# Patient Record
Sex: FEMALE | Race: WHITE | NOT HISPANIC OR LATINO | Employment: FULL TIME | URBAN - METROPOLITAN AREA
[De-identification: names, ages, dates, MRNs, and addresses within clinical notes are randomized per-mention and may not be internally consistent; named-entity substitution may affect disease eponyms.]

---

## 2017-05-04 ENCOUNTER — ALLSCRIPTS OFFICE VISIT (OUTPATIENT)
Dept: OTHER | Facility: OTHER | Age: 22
End: 2017-05-04

## 2018-01-12 VITALS
HEIGHT: 62 IN | DIASTOLIC BLOOD PRESSURE: 80 MMHG | SYSTOLIC BLOOD PRESSURE: 130 MMHG | WEIGHT: 125.25 LBS | BODY MASS INDEX: 23.05 KG/M2

## 2018-07-19 ENCOUNTER — HOSPITAL ENCOUNTER (EMERGENCY)
Facility: HOSPITAL | Age: 23
Discharge: HOME | End: 2018-07-20
Attending: EMERGENCY MEDICINE | Admitting: EMERGENCY MEDICINE
Payer: COMMERCIAL

## 2018-07-19 ENCOUNTER — APPOINTMENT (EMERGENCY)
Dept: CT IMAGING | Facility: HOSPITAL | Age: 23
End: 2018-07-19
Payer: COMMERCIAL

## 2018-07-19 DIAGNOSIS — N39.0 UTI (URINARY TRACT INFECTION): Primary | ICD-10-CM

## 2018-07-19 DIAGNOSIS — K76.9 HEPATIC LESION: ICD-10-CM

## 2018-07-19 DIAGNOSIS — N83.201 RIGHT OVARIAN CYST: ICD-10-CM

## 2018-07-19 LAB
ALBUMIN SERPL BCP-MCNC: 4.3 G/DL (ref 3.5–5.7)
ALP SERPL-CCNC: 81 U/L (ref 40–150)
ALT SERPL W P-5'-P-CCNC: 6 U/L (ref 7–52)
ANION GAP SERPL CALCULATED.3IONS-SCNC: 4 MMOL/L (ref 4–13)
AST SERPL W P-5'-P-CCNC: 25 U/L (ref 13–39)
BACTERIA UR QL AUTO: ABNORMAL /HPF
BASOPHILS # BLD AUTO: 0.1 THOUSANDS/ΜL (ref 0–0.1)
BASOPHILS NFR BLD AUTO: 0 % (ref 0–2)
BILIRUB SERPL-MCNC: 0.4 MG/DL (ref 0.2–1)
BILIRUB UR QL STRIP: ABNORMAL
BUN SERPL-MCNC: 6 MG/DL (ref 7–25)
CALCIUM SERPL-MCNC: 9.8 MG/DL (ref 8.6–10.5)
CHLORIDE SERPL-SCNC: 102 MMOL/L (ref 98–107)
CLARITY UR: ABNORMAL
CO2 SERPL-SCNC: 30 MMOL/L (ref 21–31)
COLOR UR: YELLOW
CREAT SERPL-MCNC: 0.78 MG/DL (ref 0.6–1.2)
EOSINOPHIL # BLD AUTO: 0.3 THOUSAND/ΜL (ref 0–0.61)
EOSINOPHIL NFR BLD AUTO: 2 % (ref 0–5)
ERYTHROCYTE [DISTWIDTH] IN BLOOD BY AUTOMATED COUNT: 12.7 % (ref 11.5–14.5)
EXT PREG TEST URINE: NEGATIVE
GFR SERPL CREATININE-BSD FRML MDRD: 107 ML/MIN/1.73SQ M
GLUCOSE SERPL-MCNC: 131 MG/DL (ref 65–99)
GLUCOSE UR STRIP-MCNC: NEGATIVE MG/DL
HCT VFR BLD AUTO: 38.6 % (ref 34.8–46.1)
HGB BLD-MCNC: 13.3 G/DL (ref 12–16)
HGB UR QL STRIP.AUTO: ABNORMAL
KETONES UR STRIP-MCNC: ABNORMAL MG/DL
LEUKOCYTE ESTERASE UR QL STRIP: ABNORMAL
LIPASE SERPL-CCNC: 15 U/L (ref 11–82)
LYMPHOCYTES # BLD AUTO: 2 THOUSANDS/ΜL (ref 0.6–4.47)
LYMPHOCYTES NFR BLD AUTO: 14 % (ref 21–51)
MCH RBC QN AUTO: 30 PG (ref 26–34)
MCHC RBC AUTO-ENTMCNC: 34.4 G/DL (ref 31–37)
MCV RBC AUTO: 87 FL (ref 81–99)
MONOCYTES # BLD AUTO: 1 THOUSAND/ΜL (ref 0.17–1.22)
MONOCYTES NFR BLD AUTO: 7 % (ref 2–12)
MUCOUS THREADS UR QL AUTO: ABNORMAL
NEUTROPHILS # BLD AUTO: 11.4 THOUSANDS/ΜL (ref 1.4–6.5)
NEUTS SEG NFR BLD AUTO: 77 % (ref 42–75)
NITRITE UR QL STRIP: NEGATIVE
NON-SQ EPI CELLS URNS QL MICRO: ABNORMAL /HPF
NRBC BLD AUTO-RTO: 0 /100 WBCS
PH UR STRIP.AUTO: 6 [PH] (ref 5–8)
PLATELET # BLD AUTO: 360 THOUSANDS/UL (ref 149–390)
PMV BLD AUTO: 8 FL (ref 8.6–11.7)
POTASSIUM SERPL-SCNC: 5.2 MMOL/L (ref 3.5–5.5)
PROT SERPL-MCNC: 7.3 G/DL (ref 6.4–8.9)
PROT UR STRIP-MCNC: ABNORMAL MG/DL
RBC # BLD AUTO: 4.42 MILLION/UL (ref 3.9–5.2)
RBC #/AREA URNS AUTO: ABNORMAL /HPF
SODIUM SERPL-SCNC: 136 MMOL/L (ref 134–143)
SP GR UR STRIP.AUTO: >=1.03 (ref 1–1.03)
UROBILINOGEN UR QL STRIP.AUTO: 0.2 E.U./DL
WBC # BLD AUTO: 14.7 THOUSAND/UL (ref 4.8–10.8)
WBC #/AREA URNS AUTO: ABNORMAL /HPF

## 2018-07-19 PROCEDURE — 81025 URINE PREGNANCY TEST: CPT | Performed by: EMERGENCY MEDICINE

## 2018-07-19 PROCEDURE — 96361 HYDRATE IV INFUSION ADD-ON: CPT

## 2018-07-19 PROCEDURE — 87086 URINE CULTURE/COLONY COUNT: CPT | Performed by: EMERGENCY MEDICINE

## 2018-07-19 PROCEDURE — 96374 THER/PROPH/DIAG INJ IV PUSH: CPT

## 2018-07-19 PROCEDURE — 81001 URINALYSIS AUTO W/SCOPE: CPT | Performed by: EMERGENCY MEDICINE

## 2018-07-19 PROCEDURE — 36415 COLL VENOUS BLD VENIPUNCTURE: CPT | Performed by: EMERGENCY MEDICINE

## 2018-07-19 PROCEDURE — 74177 CT ABD & PELVIS W/CONTRAST: CPT

## 2018-07-19 PROCEDURE — 83690 ASSAY OF LIPASE: CPT | Performed by: EMERGENCY MEDICINE

## 2018-07-19 PROCEDURE — 85025 COMPLETE CBC W/AUTO DIFF WBC: CPT | Performed by: EMERGENCY MEDICINE

## 2018-07-19 PROCEDURE — 80053 COMPREHEN METABOLIC PANEL: CPT | Performed by: EMERGENCY MEDICINE

## 2018-07-19 RX ORDER — HYDROXYZINE PAMOATE 25 MG/1
50 CAPSULE ORAL 3 TIMES DAILY PRN
COMMUNITY
End: 2018-08-15

## 2018-07-19 RX ORDER — KETOROLAC TROMETHAMINE 30 MG/ML
30 INJECTION, SOLUTION INTRAMUSCULAR; INTRAVENOUS ONCE
Status: COMPLETED | OUTPATIENT
Start: 2018-07-19 | End: 2018-07-19

## 2018-07-19 RX ORDER — SERTRALINE HYDROCHLORIDE 25 MG/1
25 TABLET, FILM COATED ORAL DAILY
COMMUNITY
End: 2018-08-15

## 2018-07-19 RX ADMIN — SODIUM CHLORIDE 1000 ML: 0.9 INJECTION, SOLUTION INTRAVENOUS at 21:24

## 2018-07-19 RX ADMIN — IOHEXOL 80 ML: 350 INJECTION, SOLUTION INTRAVENOUS at 23:19

## 2018-07-19 RX ADMIN — KETOROLAC TROMETHAMINE 30 MG: 30 INJECTION, SOLUTION INTRAMUSCULAR; INTRAVENOUS at 21:24

## 2018-07-20 VITALS
OXYGEN SATURATION: 100 % | DIASTOLIC BLOOD PRESSURE: 86 MMHG | HEART RATE: 95 BPM | RESPIRATION RATE: 18 BRPM | BODY MASS INDEX: 22.08 KG/M2 | SYSTOLIC BLOOD PRESSURE: 127 MMHG | HEIGHT: 62 IN | WEIGHT: 120 LBS | TEMPERATURE: 98.3 F

## 2018-07-20 PROCEDURE — 99284 EMERGENCY DEPT VISIT MOD MDM: CPT

## 2018-07-20 RX ORDER — CEPHALEXIN 500 MG/1
500 CAPSULE ORAL EVERY 6 HOURS SCHEDULED
Qty: 28 CAPSULE | Refills: 0 | Status: SHIPPED | OUTPATIENT
Start: 2018-07-20 | End: 2018-07-27

## 2018-07-20 RX ORDER — CEPHALEXIN 500 MG/1
500 CAPSULE ORAL ONCE
Status: COMPLETED | OUTPATIENT
Start: 2018-07-20 | End: 2018-07-20

## 2018-07-20 RX ADMIN — CEPHALEXIN 500 MG: 500 CAPSULE ORAL at 00:33

## 2018-07-20 NOTE — ED PROVIDER NOTES
History  Chief Complaint   Patient presents with    Pelvic Pain     Pt states she's had pelvic pain and cramping x3 days  Pt thought she was constipated and took laxatives, had BM but did not feel better  Pt did not call PCP  Patient presents to the emergency department with lower abdominal pain and pressure started about 3 days ago still present and worsening thought she was constipated took laxatives and got diarrhea pain and pressure still persists no vaginal bleeding discharge  No vomiting no fevers or chills  History provided by:  Patient  Abdominal Pain   Pain location:  Suprapubic, RLQ and LLQ  Pain quality: cramping and pressure    Pain radiates to:  Does not radiate  Pain severity:  Mild  Onset quality:  Gradual  Duration:  3 days  Timing:  Constant  Progression:  Worsening  Chronicity:  New  Associated symptoms: constipation and diarrhea    Associated symptoms: no chest pain, no chills, no cough, no dysuria, no fatigue, no fever, no hematuria, no nausea, no shortness of breath, no sore throat and no vomiting        Prior to Admission Medications   Prescriptions Last Dose Informant Patient Reported? Taking?   hydrOXYzine pamoate (VISTARIL) 25 mg capsule   Yes Yes   Sig: Take 50 mg by mouth 3 (three) times a day as needed for anxiety   sertraline (ZOLOFT) 25 mg tablet   Yes Yes   Sig: Take 25 mg by mouth daily      Facility-Administered Medications: None       Past Medical History:   Diagnosis Date    Anxiety disorder     Social anxiety disorder        History reviewed  No pertinent surgical history  History reviewed  No pertinent family history  I have reviewed and agree with the history as documented      Social History   Substance Use Topics    Smoking status: Current Every Day Smoker     Packs/day: 1 00     Types: Cigarettes    Smokeless tobacco: Never Used    Alcohol use No        Review of Systems   Constitutional: Negative for activity change, appetite change, chills, fatigue and fever  HENT: Negative for congestion, ear pain, rhinorrhea and sore throat  Eyes: Negative for discharge, redness and visual disturbance  Respiratory: Negative for cough, chest tightness, shortness of breath and wheezing  Cardiovascular: Negative for chest pain and palpitations  Gastrointestinal: Positive for abdominal pain, constipation and diarrhea  Negative for nausea and vomiting  Endocrine: Negative for polydipsia and polyuria  Genitourinary: Negative for difficulty urinating, dysuria, frequency, hematuria and urgency  Musculoskeletal: Negative for arthralgias and myalgias  Skin: Negative for color change, pallor and rash  Neurological: Negative for dizziness, weakness, light-headedness, numbness and headaches  Hematological: Negative for adenopathy  Does not bruise/bleed easily  All other systems reviewed and are negative  Physical Exam  Physical Exam   Constitutional: She is oriented to person, place, and time  She appears well-developed and well-nourished  HENT:   Head: Normocephalic and atraumatic  Right Ear: External ear normal    Left Ear: External ear normal    Nose: Nose normal    Mouth/Throat: Oropharynx is clear and moist    Eyes: Conjunctivae and EOM are normal  Pupils are equal, round, and reactive to light  Neck: Normal range of motion  Neck supple  Cardiovascular: Normal rate, regular rhythm, normal heart sounds and intact distal pulses  Pulmonary/Chest: Effort normal and breath sounds normal  No respiratory distress  She has no wheezes  She has no rales  She exhibits no tenderness  Abdominal: Soft  Bowel sounds are normal  She exhibits no distension  There is tenderness in the right lower quadrant, suprapubic area and left lower quadrant  There is no rebound and no guarding  Musculoskeletal: Normal range of motion  Neurological: She is alert and oriented to person, place, and time  No cranial nerve deficit or sensory deficit     Skin: Skin is warm and dry  Psychiatric: She has a normal mood and affect  Nursing note and vitals reviewed  Vital Signs  ED Triage Vitals [07/19/18 2038]   Temperature Pulse Respirations Blood Pressure SpO2   98 3 °F (36 8 °C) (!) 124 18 127/86 100 %      Temp Source Heart Rate Source Patient Position - Orthostatic VS BP Location FiO2 (%)   Temporal Monitor -- Left arm --      Pain Score       Worst Possible Pain           Vitals:    07/19/18 2038   BP: 127/86   Pulse: (!) 124       Visual Acuity      ED Medications  Medications   cephalexin (KEFLEX) capsule 500 mg (not administered)   ketorolac (TORADOL) injection 30 mg (30 mg Intravenous Given 7/19/18 2124)   sodium chloride 0 9 % bolus 1,000 mL (1,000 mL Intravenous New Bag 7/19/18 2124)   iohexol (OMNIPAQUE) 350 MG/ML injection (SINGLE-DOSE) 100 mL (80 mL Intravenous Given 7/19/18 2319)       Diagnostic Studies  Results Reviewed     Procedure Component Value Units Date/Time    Urine Microscopic [[id number]]  (Abnormal) Collected:  07/19/18 2303    Lab Status:  Final result Specimen:  Urine from Urine, Clean Catch Updated:  07/19/18 2313     RBC, UA 0-5 /hpf      WBC, UA 10-20 (A) /hpf      Epithelial Cells Occasional /hpf      Bacteria, UA Innumerable (A) /hpf      MUCOUS THREADS Moderate (A)    Urine culture [[id number]] Collected:  07/19/18 2303    Lab Status:   In process Specimen:  Urine from Urine, Clean Catch Updated:  07/19/18 2313    UA w Reflex to Microscopic w Reflex to Culture [[id number]]  (Abnormal) Collected:  07/19/18 2303    Lab Status:  Final result Specimen:  Urine from Urine, Clean Catch Updated:  07/19/18 2312     Color, UA Yellow     Clarity, UA Cloudy (A)     Specific Gravity, UA >=1 030 (H)     pH, UA 6 0     Leukocytes, UA Trace (A)     Nitrite, UA Negative     Protein, UA 1+ (A) mg/dl      Glucose, UA Negative mg/dl      Ketones, UA Trace (A) mg/dl      Urobilinogen, UA 0 2 E U /dl      Bilirubin, UA 1+ (A)     Blood, UA Trace-Intact (A)    POCT pregnancy, urine [[id number]]  (Normal) Resulted:  07/19/18 2307    Lab Status:  Final result Specimen:  Urine Updated:  07/19/18 2307     EXT PREG TEST UR (Ref: Negative) negative    Lipase [[id number]]  (Normal) Collected:  07/19/18 2124    Lab Status:  Final result Specimen:  Blood from Arm, Left Updated:  07/19/18 2157     Lipase 15 u/L     CMP [[id number]]  (Abnormal) Collected:  07/19/18 2124    Lab Status:  Final result Specimen:  Blood from Arm, Left Updated:  07/19/18 2156     Sodium 136 mmol/L      Potassium 5 2 mmol/L      Chloride 102 mmol/L      CO2 30 mmol/L      Anion Gap 4 mmol/L      BUN 6 (L) mg/dL      Creatinine 0 78 mg/dL      Glucose 131 (H) mg/dL      Calcium 9 8 mg/dL      AST 25 U/L      ALT 6 (L) U/L      Alkaline Phosphatase 81 U/L      Total Protein 7 3 g/dL      Albumin 4 3 g/dL      Total Bilirubin 0 40 mg/dL      eGFR 107 ml/min/1 73sq m     Narrative:         National Kidney Disease Education Program recommendations are as follows:  GFR calculation is accurate only with a steady state creatinine  Chronic Kidney disease less than 60 ml/min/1 73 sq  meters  Kidney failure less than 15 ml/min/1 73 sq  meters      CBC and differential [[id number]]  (Abnormal) Collected:  07/19/18 2124    Lab Status:  Final result Specimen:  Blood from Arm, Left Updated:  07/19/18 2134     WBC 14 70 (H) Thousand/uL      RBC 4 42 Million/uL      Hemoglobin 13 3 g/dL      Hematocrit 38 6 %      MCV 87 fL      MCH 30 0 pg      MCHC 34 4 g/dL      RDW 12 7 %      MPV 8 0 (L) fL      Platelets 815 Thousands/uL      nRBC 0 /100 WBCs      Neutrophils Relative 77 (H) %      Lymphocytes Relative 14 (L) %      Monocytes Relative 7 %      Eosinophils Relative 2 %      Basophils Relative 0 %      Neutrophils Absolute 11 40 (H) Thousands/µL      Lymphocytes Absolute 2 00 Thousands/µL      Monocytes Absolute 1 00 Thousand/µL      Eosinophils Absolute 0 30 Thousand/µL      Basophils Absolute 0 10 Thousands/µL                  CT abdomen pelvis with contrast   Final Result by Nnamdi Rueda (07/20 0008)      1  2 1 cm ovoid shaped low density structure within the region of the   right adnexa within the pelvis, favored to represent an ovarian cyst     Further evaluation is recommended with pelvic ultrasound if there is   clinical concern for ovarian torsion  2   The appendix is not clearly identified  No definite acute pathology in   the right lower quadrant of the abdomen  If there is significant clinical   concern for appendicitis, recommend close clinical surveillance and short   interval (12 to 24 hours) follow-up CT imaging with oral contrast      3   0 7 cm low density lesion within the right hepatic lobe is too small   to thoroughly evaluate CT imaging  No priors are available to provide the   benefit of comparison  Recommend one additional follow-up evaluation with   CT or MR imaging to assess the stability of this small and indeterminate   lesion  Signed by LISA King  Procedures  Procedures       Phone Contacts  ED Phone Contact    ED Course                               MDM  Number of Diagnoses or Management Options  Hepatic lesion: new and requires workup  Right ovarian cyst: new and requires workup  UTI (urinary tract infection): new and requires workup  Diagnosis management comments: Patient is nontoxic well-appearing in the emergency department abdominal exam is benign minimally tender on repeat evaluation and patient feels much improved after IV fluids and Toradol very low suspicion for acute appendicitis at this time is patient feels improved and symptoms seem most consistent with a urinary tract infection which is noted to be present and suspect this is the cause of the slight leukocytosis  Very low speed clinical suspicion of torsion as symptoms have been ongoing and intermittent for the past 3 days and not appreciably changed work acute today on presentation    Advised patient of ovarian cyst as well as hepatic lesion noted on CT scan advised antibiotics now for urinary tract infection rest plenty of fluids supportive care and prompt follow-up with primary care physician for re-evaluation and obtain test results  Return precautions and anticipatory guidance discussed  Amount and/or Complexity of Data Reviewed  Clinical lab tests: ordered and reviewed  Tests in the radiology section of CPT®: reviewed and ordered  Decide to obtain previous medical records or to obtain history from someone other than the patient: yes  Review and summarize past medical records: yes    Risk of Complications, Morbidity, and/or Mortality  Presenting problems: moderate  Management options: moderate    Patient Progress  Patient progress: stable    CritCare Time    Disposition  Final diagnoses:   UTI (urinary tract infection)   Right ovarian cyst   Hepatic lesion     Time reflects when diagnosis was documented in both MDM as applicable and the Disposition within this note     Time User Action Codes Description Comment    7/20/2018 12:16 AM Katia Hemp Add [N39 0] UTI (urinary tract infection)     7/20/2018 12:16 AM Katia Hemp Add [N83 201] Right ovarian cyst     7/20/2018 12:16 AM Katia Hemp Add [K76 9] Hepatic lesion       ED Disposition     ED Disposition Condition Comment    Discharge  Seun Brandt discharge to home/self care      Condition at discharge: Stable        Follow-up Information     Follow up With Specialties Details Why Contact Info    Johnna Steinberg DO Family Medicine Schedule an appointment as soon as possible for a visit in 3 days  [address] [place] [address]  [phone]            Patient's Medications   Discharge Prescriptions    CEPHALEXIN (KEFLEX) 500 MG CAPSULE    Take 1 capsule (500 mg total) by mouth every 6 (six) hours for 7 days       Start Date: 7/20/2018 End Date: 7/27/2018       Order Dose: 500 mg       Quantity: 28 capsule    Refills: 0     No discharge procedures on file      ED Provider  Electronically Signed by           Tavo Delgado DO  07/20/18 0025

## 2018-07-20 NOTE — DISCHARGE INSTRUCTIONS
Ovarian Cyst   WHAT YOU NEED TO KNOW:   An ovarian cyst is a sac that grows on an ovary  This sac usually contains fluid, but may sometimes have blood or tissue in it  Most ovarian cysts are harmless and go away without treatment in a few months  Some cysts can grow large, cause pain, or break open  DISCHARGE INSTRUCTIONS:   Call 911 for any of the following:   · You are too weak or dizzy to stand up  Return to the emergency department if:   · You have severe abdominal pain  The pain may be sharp and sudden  · You have a fever  Contact your healthcare provider if:   · Your periods are early, late, or more painful than usual     · You have bleeding from your vagina that is not your period  · You have abdominal pain all the time  · Your abdomen is swollen  · You have feelings of fullness, pressure, or discomfort in your abdomen  · You have trouble urinating or emptying your bladder completely  · You have pain during sex  · You are losing weight without trying  · You have questions or concerns about your condition or care  Medicines: You may need any of the following:  · NSAIDs , such as ibuprofen, help decrease swelling, pain, and fever  This medicine is available with or without a doctor's order  NSAIDs can cause stomach bleeding or kidney problems in certain people  If you take blood thinner medicine, always ask if NSAIDs are safe for you  Always read the medicine label and follow directions  Do not give these medicines to children under 10months of age without direction from your child's healthcare provider  · Birth control pills  may help to control your periods, prevent cysts, or cause them to shrink  · Take your medicine as directed  Contact your healthcare provider if you think your medicine is not helping or if you have side effects  Tell him or her if you are allergic to any medicine  Keep a list of the medicines, vitamins, and herbs you take   Include the amounts, and when and why you take them  Bring the list or the pill bottles to follow-up visits  Carry your medicine list with you in case of an emergency  Follow up with your healthcare provider as directed:  Write down your questions so you remember to ask them during your visits  Apply heat to decrease pain and cramping:  Sit in a warm bath, or place a heating pad (turned on low) or a hot water bottle on your abdomen  Do this for 15 to 20 minutes every hour for as many days as directed  © 2017 [address] Information is for End User's use only and may not be sold, redistributed or otherwise used for commercial purposes  All illustrations and images included in CareNotes® are the copyrighted property of [employer]  or [employer]afin Lewis  The above information is an  only  It is not intended as medical advice for individual conditions or treatments  Talk to your doctor, nurse or pharmacist before following any medical regimen to see if it is safe and effective for you  Urinary Tract Infection in Women, Ambulatory Care   GENERAL INFORMATION:   A urinary tract infection (UTI)  is caused by bacteria that get inside your urinary tract  Most bacteria that enter your urinary tract are expelled when you urinate  If the bacteria stay in your urinary tract, you may get an infection  Your urinary tract includes your kidneys, ureters, bladder, and urethra  Urine is made in your kidneys, and it flows from the ureters to the bladder  Urine leaves the bladder through the urethra  A UTI is more common in your lower urinary tract, which includes your bladder and urethra          Common symptoms include the following:   · Urinating more often or waking from sleep to urinate    · Pain or burning when you urinate    · Pain or pressure in your lower abdomen     · Urine that smells bad    · Blood in your urine    · Leaking urine  Seek immediate care for the following symptoms:   · Urinating very little or not at all    · Vomiting    · Shaking chills with a fever    · Side or back pain that gets worse  Treatment for a UTI  may include medicines to treat a bacterial infection  You may also need medicines to decrease pain and burning, or decrease the urge to urinate often  Prevent a UTI:   · Urinate when you feel the urge  Do not hold your urine  Urinate as soon as you feel you have to  · Drink liquids as directed  Ask how much liquid to drink each day and which liquids are best for you  You may need to drink more fluids than usual to help flush out the bacteria  Do not drink alcohol, caffeine, and citrus juices  These can irritate your bladder and increase your symptoms  · Apply heat  on your abdomen for 20 to 30 minutes every 2 hours for as many days as directed  Heat helps decrease discomfort and pressure in your bladder  Follow up with your healthcare provider as directed:  Write down your questions so you remember to ask them during your visits  CARE AGREEMENT:   You have the right to help plan your care  Learn about your health condition and how it may be treated  Discuss treatment options with your caregivers to decide what care you want to receive  You always have the right to refuse treatment  The above information is an  only  It is not intended as medical advice for individual conditions or treatments  Talk to your doctor, nurse or pharmacist before following any medical regimen to see if it is safe and effective for you  © 2014 [address] is for End User's use only and may not be sold, redistributed or otherwise used for commercial purposes  All illustrations and images included in CareNotes® are the copyrighted property of [employer]  or [employer]in Lewis

## 2018-07-21 LAB — BACTERIA UR CULT: NORMAL

## 2018-08-15 ENCOUNTER — OFFICE VISIT (OUTPATIENT)
Dept: FAMILY MEDICINE CLINIC | Facility: CLINIC | Age: 23
End: 2018-08-15
Payer: COMMERCIAL

## 2018-08-15 VITALS
SYSTOLIC BLOOD PRESSURE: 124 MMHG | HEIGHT: 62 IN | DIASTOLIC BLOOD PRESSURE: 82 MMHG | TEMPERATURE: 98.3 F | BODY MASS INDEX: 20.35 KG/M2 | WEIGHT: 110.6 LBS

## 2018-08-15 DIAGNOSIS — F17.200 SMOKING: ICD-10-CM

## 2018-08-15 DIAGNOSIS — F41.0 PANIC ATTACKS: ICD-10-CM

## 2018-08-15 DIAGNOSIS — N83.201 CYST OF RIGHT OVARY: Primary | ICD-10-CM

## 2018-08-15 DIAGNOSIS — F41.9 ANXIETY: ICD-10-CM

## 2018-08-15 PROCEDURE — 3008F BODY MASS INDEX DOCD: CPT | Performed by: FAMILY MEDICINE

## 2018-08-15 PROCEDURE — 99214 OFFICE O/P EST MOD 30 MIN: CPT | Performed by: FAMILY MEDICINE

## 2018-08-15 RX ORDER — BUSPIRONE HYDROCHLORIDE 5 MG/1
5 TABLET ORAL 3 TIMES DAILY
Qty: 90 TABLET | Refills: 3 | Status: SHIPPED | OUTPATIENT
Start: 2018-08-15

## 2018-08-15 NOTE — PROGRESS NOTES
Assessment/Plan:    No problem-specific Assessment & Plan notes found for this encounter  Diagnoses and all orders for this visit:    Cyst of right ovary  -     Ambulatory referral to Obstetrics / Gynecology; Future    Anxiety  -     busPIRone (BUSPAR) 5 mg tablet; Take 1 tablet (5 mg total) by mouth 3 (three) times a day    Smoking  Comments:  pt counseled on quitting smoking    Panic attacks          Subjective:      Patient ID: Sebastian Gibson is a 20 y o  female  ER follow up for abdominal pain in the lower abdomen, pt was found to right ovarian cyst which she is concerned, the abdominal pain is now gone, pt was found to have a UTI and was given keflex and is doing well, pt also complains of anxiety and panic attacks, pt was on visteril and zoloft and seeing psychiatry but stopped seeing them and stopped her medications on her own        The following portions of the patient's history were reviewed and updated as appropriate: allergies, current medications, past family history, past medical history, past social history, past surgical history and problem list     Review of Systems   Constitutional: Negative for chills, fever and unexpected weight change  Gastrointestinal: Negative for abdominal pain, nausea and vomiting  Genitourinary: Negative for difficulty urinating and dysuria  Objective:      /82 (BP Location: Right arm, Patient Position: Sitting, Cuff Size: Adult)   Temp 98 3 °F (36 8 °C) (Tympanic)   Ht 5' 2" (1 575 m)   Wt 50 2 kg (110 lb 9 6 oz)   BMI 20 23 kg/m²          Physical Exam   Constitutional: She is oriented to person, place, and time  She appears well-developed and well-nourished  No distress  HENT:   Head: Normocephalic and atraumatic  Eyes: Conjunctivae and EOM are normal  Pupils are equal, round, and reactive to light  No scleral icterus  Neck: Normal range of motion  Neck supple  Cardiovascular: Normal rate, regular rhythm and normal heart sounds  No murmur heard  Pulmonary/Chest: Effort normal and breath sounds normal  No respiratory distress  She has no wheezes  She has no rales  Abdominal: Soft  Bowel sounds are normal  She exhibits no distension and no mass  There is no tenderness  There is no rebound and no guarding  Musculoskeletal: She exhibits no edema  Lymphadenopathy:     She has no cervical adenopathy  Neurological: She is alert and oriented to person, place, and time  She exhibits normal muscle tone  Skin: Skin is warm and dry  No rash noted  She is not diaphoretic  No erythema  No pallor  Psychiatric: She has a normal mood and affect  Her behavior is normal  Judgment and thought content normal    Nursing note and vitals reviewed

## 2019-03-02 ENCOUNTER — APPOINTMENT (EMERGENCY)
Dept: CT IMAGING | Facility: HOSPITAL | Age: 24
End: 2019-03-02

## 2019-03-02 ENCOUNTER — APPOINTMENT (EMERGENCY)
Dept: RADIOLOGY | Facility: HOSPITAL | Age: 24
End: 2019-03-02

## 2019-03-02 ENCOUNTER — HOSPITAL ENCOUNTER (EMERGENCY)
Facility: HOSPITAL | Age: 24
Discharge: HOME | End: 2019-03-02
Attending: EMERGENCY MEDICINE

## 2019-03-02 VITALS
TEMPERATURE: 96.9 F | SYSTOLIC BLOOD PRESSURE: 112 MMHG | RESPIRATION RATE: 16 BRPM | WEIGHT: 127.87 LBS | OXYGEN SATURATION: 100 % | HEART RATE: 82 BPM | HEIGHT: 62 IN | DIASTOLIC BLOOD PRESSURE: 75 MMHG | BODY MASS INDEX: 23.53 KG/M2

## 2019-03-02 DIAGNOSIS — N83.209 OVARIAN CYST: ICD-10-CM

## 2019-03-02 DIAGNOSIS — K44.9 HIATAL HERNIA: ICD-10-CM

## 2019-03-02 DIAGNOSIS — R10.9 ABDOMINAL PAIN: Primary | ICD-10-CM

## 2019-03-02 DIAGNOSIS — R07.9 CHEST PAIN: ICD-10-CM

## 2019-03-02 LAB
ALBUMIN SERPL BCP-MCNC: 3.8 G/DL (ref 3.5–5)
ALP SERPL-CCNC: 106 U/L (ref 46–116)
ALT SERPL W P-5'-P-CCNC: 16 U/L (ref 12–78)
ANION GAP SERPL CALCULATED.3IONS-SCNC: 6 MMOL/L (ref 4–13)
APTT PPP: 28 SECONDS (ref 26–38)
AST SERPL W P-5'-P-CCNC: 17 U/L (ref 5–45)
BACTERIA UR QL AUTO: ABNORMAL /HPF
BASOPHILS # BLD AUTO: 0.08 THOUSANDS/ΜL (ref 0–0.1)
BASOPHILS NFR BLD AUTO: 1 % (ref 0–1)
BILIRUB SERPL-MCNC: 0.2 MG/DL (ref 0.2–1)
BILIRUB UR QL STRIP: NEGATIVE
BUN SERPL-MCNC: 8 MG/DL (ref 5–25)
CALCIUM SERPL-MCNC: 9.2 MG/DL (ref 8.3–10.1)
CHLORIDE SERPL-SCNC: 102 MMOL/L (ref 100–108)
CLARITY UR: ABNORMAL
CO2 SERPL-SCNC: 32 MMOL/L (ref 21–32)
COLOR UR: YELLOW
CREAT SERPL-MCNC: 0.73 MG/DL (ref 0.6–1.3)
EOSINOPHIL # BLD AUTO: 0.35 THOUSAND/ΜL (ref 0–0.61)
EOSINOPHIL NFR BLD AUTO: 3 % (ref 0–6)
ERYTHROCYTE [DISTWIDTH] IN BLOOD BY AUTOMATED COUNT: 13.1 % (ref 11.6–15.1)
EXT PREG TEST URINE: NEGATIVE
GFR SERPL CREATININE-BSD FRML MDRD: 116 ML/MIN/1.73SQ M
GLUCOSE SERPL-MCNC: 82 MG/DL (ref 65–140)
GLUCOSE UR STRIP-MCNC: NEGATIVE MG/DL
HCT VFR BLD AUTO: 42.4 % (ref 34.8–46.1)
HGB BLD-MCNC: 13.6 G/DL (ref 11.5–15.4)
HGB UR QL STRIP.AUTO: ABNORMAL
IMM GRANULOCYTES # BLD AUTO: 0.04 THOUSAND/UL (ref 0–0.2)
IMM GRANULOCYTES NFR BLD AUTO: 0 % (ref 0–2)
INR PPP: 0.98 (ref 0.86–1.17)
KETONES UR STRIP-MCNC: NEGATIVE MG/DL
LEUKOCYTE ESTERASE UR QL STRIP: ABNORMAL
LIPASE SERPL-CCNC: 124 U/L (ref 73–393)
LYMPHOCYTES # BLD AUTO: 3.03 THOUSANDS/ΜL (ref 0.6–4.47)
LYMPHOCYTES NFR BLD AUTO: 29 % (ref 14–44)
MAGNESIUM SERPL-MCNC: 1.9 MG/DL (ref 1.6–2.6)
MCH RBC QN AUTO: 29.3 PG (ref 26.8–34.3)
MCHC RBC AUTO-ENTMCNC: 32.1 G/DL (ref 31.4–37.4)
MCV RBC AUTO: 91 FL (ref 82–98)
MONOCYTES # BLD AUTO: 0.73 THOUSAND/ΜL (ref 0.17–1.22)
MONOCYTES NFR BLD AUTO: 7 % (ref 4–12)
NEUTROPHILS # BLD AUTO: 6.07 THOUSANDS/ΜL (ref 1.85–7.62)
NEUTS SEG NFR BLD AUTO: 60 % (ref 43–75)
NITRITE UR QL STRIP: NEGATIVE
NON-SQ EPI CELLS URNS QL MICRO: ABNORMAL /HPF
NRBC BLD AUTO-RTO: 0 /100 WBCS
PH UR STRIP.AUTO: 6.5 [PH] (ref 4.5–8)
PLATELET # BLD AUTO: 386 THOUSANDS/UL (ref 149–390)
PMV BLD AUTO: 9.6 FL (ref 8.9–12.7)
POTASSIUM SERPL-SCNC: 4.1 MMOL/L (ref 3.5–5.3)
PROT SERPL-MCNC: 7.6 G/DL (ref 6.4–8.2)
PROT UR STRIP-MCNC: NEGATIVE MG/DL
PROTHROMBIN TIME: 12.5 SECONDS (ref 11.8–14.2)
RBC # BLD AUTO: 4.64 MILLION/UL (ref 3.81–5.12)
RBC #/AREA URNS AUTO: ABNORMAL /HPF
SODIUM SERPL-SCNC: 140 MMOL/L (ref 136–145)
SP GR UR STRIP.AUTO: 1.01 (ref 1–1.03)
TROPONIN I SERPL-MCNC: <0.02 NG/ML
UROBILINOGEN UR QL STRIP.AUTO: 0.2 E.U./DL
WBC # BLD AUTO: 10.3 THOUSAND/UL (ref 4.31–10.16)
WBC #/AREA URNS AUTO: ABNORMAL /HPF

## 2019-03-02 PROCEDURE — 36415 COLL VENOUS BLD VENIPUNCTURE: CPT | Performed by: PHYSICIAN ASSISTANT

## 2019-03-02 PROCEDURE — 99285 EMERGENCY DEPT VISIT HI MDM: CPT

## 2019-03-02 PROCEDURE — 96360 HYDRATION IV INFUSION INIT: CPT

## 2019-03-02 PROCEDURE — 80053 COMPREHEN METABOLIC PANEL: CPT | Performed by: PHYSICIAN ASSISTANT

## 2019-03-02 PROCEDURE — 81001 URINALYSIS AUTO W/SCOPE: CPT | Performed by: PHYSICIAN ASSISTANT

## 2019-03-02 PROCEDURE — 81025 URINE PREGNANCY TEST: CPT | Performed by: PHYSICIAN ASSISTANT

## 2019-03-02 PROCEDURE — 71046 X-RAY EXAM CHEST 2 VIEWS: CPT

## 2019-03-02 PROCEDURE — 74177 CT ABD & PELVIS W/CONTRAST: CPT

## 2019-03-02 PROCEDURE — 83690 ASSAY OF LIPASE: CPT | Performed by: PHYSICIAN ASSISTANT

## 2019-03-02 PROCEDURE — 85730 THROMBOPLASTIN TIME PARTIAL: CPT | Performed by: PHYSICIAN ASSISTANT

## 2019-03-02 PROCEDURE — 85610 PROTHROMBIN TIME: CPT | Performed by: PHYSICIAN ASSISTANT

## 2019-03-02 PROCEDURE — 85025 COMPLETE CBC W/AUTO DIFF WBC: CPT | Performed by: PHYSICIAN ASSISTANT

## 2019-03-02 PROCEDURE — 83735 ASSAY OF MAGNESIUM: CPT | Performed by: PHYSICIAN ASSISTANT

## 2019-03-02 PROCEDURE — 84484 ASSAY OF TROPONIN QUANT: CPT | Performed by: PHYSICIAN ASSISTANT

## 2019-03-02 PROCEDURE — 93005 ELECTROCARDIOGRAM TRACING: CPT

## 2019-03-02 RX ORDER — OMEPRAZOLE 20 MG/1
20 CAPSULE, DELAYED RELEASE ORAL DAILY
Qty: 20 CAPSULE | Refills: 0 | Status: SHIPPED | OUTPATIENT
Start: 2019-03-02

## 2019-03-02 RX ADMIN — SODIUM CHLORIDE 1000 ML: 0.9 INJECTION, SOLUTION INTRAVENOUS at 10:12

## 2019-03-02 RX ADMIN — IOHEXOL 100 ML: 350 INJECTION, SOLUTION INTRAVENOUS at 10:57

## 2019-03-02 NOTE — ED PROVIDER NOTES
History  Chief Complaint   Patient presents with    Abdominal Pain     Abdominal pain intermittently for two weeks     Patient presents to the emergency department today offering complaints of chest pain as well as abdominal pain  She states she has been experiencing intermittent chest pain for 2 weeks  It varies in location as well as intensity and duration  Typically is lasting about 30 minutes  She denies any belching  Denies any increased pain with meals  She also admits to intermittent abdominal pain which is generalized for 1 week  She denies nausea vomiting  She denies diarrhea or constipation  Denies black or red stool  She denies urinary urgency frequency burning  No leg pain or edema  She states she is currently menstruating  No prior abdominal surgical history however has had right ovarian cysts in the past   Has not offered any self treatments  Prior to Admission Medications   Prescriptions Last Dose Informant Patient Reported? Taking?   busPIRone (BUSPAR) 5 mg tablet Not Taking at Unknown time  No No   Sig: Take 1 tablet (5 mg total) by mouth 3 (three) times a day   Patient not taking: Reported on 3/2/2019      Facility-Administered Medications: None       Past Medical History:   Diagnosis Date    Anxiety disorder     Social anxiety disorder        History reviewed  No pertinent surgical history  Family History   Problem Relation Age of Onset    Fibromyalgia Mother     Diabetes Father         type 2    Hypertension Father      I have reviewed and agree with the history as documented  Social History     Tobacco Use    Smoking status: Current Every Day Smoker     Packs/day: 1 00     Types: Cigarettes    Smokeless tobacco: Never Used   Substance Use Topics    Alcohol use: No    Drug use: No        Review of Systems   Constitutional: Negative  HENT: Negative  Eyes: Negative  Respiratory: Positive for cough   Negative for apnea, choking, chest tightness, shortness of breath, wheezing and stridor  Cardiovascular: Positive for chest pain  Negative for palpitations and leg swelling  Gastrointestinal: Positive for abdominal pain  Negative for abdominal distention, anal bleeding, blood in stool, constipation, diarrhea, nausea, rectal pain and vomiting  Endocrine: Negative  Genitourinary: Negative  Musculoskeletal: Negative  Skin: Negative  Allergic/Immunologic: Negative  Neurological: Negative  Hematological: Negative  Psychiatric/Behavioral: Negative  All other systems reviewed and are negative  Physical Exam  Physical Exam   Constitutional: She is oriented to person, place, and time  She appears well-developed and well-nourished  Non-toxic appearance  She does not appear ill  No distress  HENT:   Head: Normocephalic  Mouth/Throat: Oropharynx is clear and moist    Eyes: Pupils are equal, round, and reactive to light  EOM are normal    Cardiovascular: Normal rate  Pulmonary/Chest: Effort normal    Abdominal: Normal appearance and bowel sounds are normal  There is tenderness in the right upper quadrant, right lower quadrant, epigastric area, periumbilical area and suprapubic area  There is no rigidity, no rebound, no guarding, no CVA tenderness and negative Sood's sign  Neurological: She is alert and oriented to person, place, and time  Skin: Skin is warm  Capillary refill takes less than 2 seconds  Vitals reviewed        Vital Signs  ED Triage Vitals [03/02/19 0919]   Temperature Pulse Respirations Blood Pressure SpO2   98 °F (36 7 °C) 95 16 156/85 99 %      Temp Source Heart Rate Source Patient Position - Orthostatic VS BP Location FiO2 (%)   Temporal Monitor Lying Right arm --      Pain Score       9           Vitals:    03/02/19 0930 03/02/19 0945 03/02/19 1047 03/02/19 1105   BP: 157/83  115/67 115/66   Pulse: 97 89 79 77   Patient Position - Orthostatic VS:           Visual Acuity      ED Medications  Medications sodium chloride 0 9 % bolus 1,000 mL (1,000 mL Intravenous New Bag 3/2/19 1012)   iohexol (OMNIPAQUE) 350 MG/ML injection (MULTI-DOSE) 100 mL (100 mL Intravenous Given 3/2/19 1057)       Diagnostic Studies  Results Reviewed     Procedure Component Value Units Date/Time    Troponin I [[id number]]  (Normal) Collected:  03/02/19 1011    Lab Status:  Final result Specimen:  Blood from Arm, Right Updated:  03/02/19 1036     Troponin I <0 02 ng/mL     Comprehensive metabolic panel [[id number]] Collected:  03/02/19 1011    Lab Status:  Final result Specimen:  Blood from Arm, Right Updated:  03/02/19 1035     Sodium 140 mmol/L      Potassium 4 1 mmol/L      Chloride 102 mmol/L      CO2 32 mmol/L      ANION GAP 6 mmol/L      BUN 8 mg/dL      Creatinine 0 73 mg/dL      Glucose 82 mg/dL      Calcium 9 2 mg/dL      AST 17 U/L      ALT 16 U/L      Alkaline Phosphatase 106 U/L      Total Protein 7 6 g/dL      Albumin 3 8 g/dL      Total Bilirubin 0 20 mg/dL      eGFR 116 ml/min/1 73sq m     Narrative:       National Kidney Disease Education Program recommendations are as follows:  GFR calculation is accurate only with a steady state creatinine  Chronic Kidney disease less than 60 ml/min/1 73 sq  meters  Kidney failure less than 15 ml/min/1 73 sq  meters      Lipase [[id number]]  (Normal) Collected:  03/02/19 1011    Lab Status:  Final result Specimen:  Blood from Arm, Right Updated:  03/02/19 1035     Lipase 124 u/L     Magnesium [[id number]]  (Normal) Collected:  03/02/19 1011    Lab Status:  Final result Specimen:  Blood from Arm, Right Updated:  03/02/19 1035     Magnesium 1 9 mg/dL     Urine Microscopic [[id number]]  (Abnormal) Collected:  03/02/19 1011    Lab Status:  Final result Specimen:  Urine, Clean Catch Updated:  03/02/19 1035     RBC, UA 0-1 /hpf      WBC, UA 1-2 /hpf      Epithelial Cells Occasional /hpf      Bacteria, UA None Seen /hpf     Protime-INR [[id number]]  (Normal) Collected:  03/02/19 1011    Lab Status:  Final result Specimen:  Blood from Arm, Right Updated:  03/02/19 1029     Protime 12 5 seconds      INR 0 98    APTT [[id number]]  (Normal) Collected:  03/02/19 1011    Lab Status:  Final result Specimen:  Blood from Arm, Right Updated:  03/02/19 1029     PTT 28 seconds     UA w Reflex to Microscopic [[id number]]  (Abnormal) Collected:  03/02/19 1011    Lab Status:  Final result Specimen:  Urine, Clean Catch Updated:  03/02/19 1023     Color, UA Yellow     Clarity, UA Slightly Cloudy     Specific Gravity, UA 1 015     pH, UA 6 5     Leukocytes, UA Trace     Nitrite, UA Negative     Protein, UA Negative mg/dl      Glucose, UA Negative mg/dl      Ketones, UA Negative mg/dl      Urobilinogen, UA 0 2 E U /dl      Bilirubin, UA Negative     Blood, UA Small    CBC and differential [[id number]]  (Abnormal) Collected:  03/02/19 1011    Lab Status:  Final result Specimen:  Blood from Arm, Right Updated:  03/02/19 1020     WBC 10 30 Thousand/uL      RBC 4 64 Million/uL      Hemoglobin 13 6 g/dL      Hematocrit 42 4 %      MCV 91 fL      MCH 29 3 pg      MCHC 32 1 g/dL      RDW 13 1 %      MPV 9 6 fL      Platelets 255 Thousands/uL      nRBC 0 /100 WBCs      Neutrophils Relative 60 %      Immat GRANS % 0 %      Lymphocytes Relative 29 %      Monocytes Relative 7 %      Eosinophils Relative 3 %      Basophils Relative 1 %      Neutrophils Absolute 6 07 Thousands/µL      Immature Grans Absolute 0 04 Thousand/uL      Lymphocytes Absolute 3 03 Thousands/µL      Monocytes Absolute 0 73 Thousand/µL      Eosinophils Absolute 0 35 Thousand/µL      Basophils Absolute 0 08 Thousands/µL     [place] draw [[id number]] Collected:  03/02/19 1011    Lab Status: In process Specimen:  Blood from Arm, Right Updated:  03/02/19 1017    Narrative: The following orders were created for panel order [place] draw    Procedure                               Abnormality         Status                     ---------                               ----------- ------                     Gold top on UETP[[id number]]                                 In process                   Please view results for these tests on the individual orders  POCT pregnancy, urine [[id number]]  (Normal) Resulted:  03/02/19 1017    Lab Status:  Final result Updated:  03/02/19 1017     EXT PREG TEST UR (Ref: Negative) negative                 CT abdomen pelvis with contrast   Final Result by True Hall DO (03/02 1101)   1  Hiatal hernia  2   Mild constipation  3   3 cm left ovarian cyst       Workstation performed: [id number]         XR chest 2 views   ED Interpretation by Mello Montgomery PA-C (03/02 1044)   No evidence of acute cardiopulmonary process                 Procedures  Procedures       Phone Contacts  ED Phone Contact    ED Course  ED Course as of Mar 02 1111   Sat Mar 02, 2019   1029 WBC(!): 10 30   1029 Hemoglobin: 13 6   1029 Platelet Count: 111   4559 Blood, UA(!): Small   1029 Bilirubin, UA: Negative   1029 Clarity, UA: Slightly Cloudy   1029 Color, UA: Yellow   1029 INR: 0 98   1043 Did perform a bedside ultrasound on this patient the supine position  I did not note any evidence gallbladder wall thickening, gallstones, sludge, shadowing, or pericholecystic fluid  Images were taken and placed on chart   Duran Martinez RN present for exam        1044 Troponin I: <0 02   1044 Lipase: 124   1044 Magnesium: 1 9   1044 INR: 0 98   1044 eGFR: 116   1044 TOTAL BILIRUBIN: 0 20   1044 Potassium: 4 1   1044 Sodium: 140   1100 Bacteria, UA: None Seen   1104 Epithelial Cells: Occasional   1104 WBC, UA(!): 1-2   1104 RBC, UA(!): 0-1   1105 ABDOMEN    LOWER CHEST:  No clinically significant abnormality identified in the visualized lower chest     LIVER/BILIARY TREE:  One or more subcentimeter sharply circumscribed low-density hepatic lesion(s) are noted, too small to accurately characterize, but statistically most likely to represent subcentimeter hepatic cysts   No suspicious solid hepatic   lesion is identified   Hepatic contours are normal   No biliary dilatation  GALLBLADDER:  No calcified gallstones  No pericholecystic inflammatory change  SPLEEN:  Unremarkable  PANCREAS:  Unremarkable  ADRENAL GLANDS:  Unremarkable  KIDNEYS/URETERS:  Unremarkable  No hydronephrosis  STOMACH AND BOWEL:    Stomach decompressed   Hiatal hernia  No evidence of small bowel obstruction  Slight increased fecal burden throughout the colon, compatible with mild constipation  APPENDIX:  No findings to suggest appendicitis  ABDOMINOPELVIC CAVITY:  No ascites or free intraperitoneal air  No lymphadenopathy  VESSELS:  Unremarkable for patient's age  PELVIS    REPRODUCTIVE ORGANS:  Uterus appears unremarkable  Gerardine Jackson is no evidence of solid adnexal mass   3 0 x 1 3 cm simple left ovarian cyst     URINARY BLADDER:  Unremarkable  ABDOMINAL WALL/INGUINAL REGIONS:  Unremarkable  OSSEOUS STRUCTURES:  No acute fracture or destructive osseous lesion  Impression     1   Hiatal hernia  2   Mild constipation    3   3 cm left ovarian cyst                 HEART Risk Score      Most Recent Value   History  0 Filed at: 03/02/2019 0951   ECG  0 Filed at: 03/02/2019 2390   Age  0 Filed at: 03/02/2019 6959   Risk Factors  0 Filed at: 03/02/2019 0951   Troponin  0 Filed at: 03/02/2019 0951   Heart Score Risk Calculator   History  0 Filed at: 03/02/2019 0951   ECG  0 Filed at: 03/02/2019 2614   Age  0 Filed at: 03/02/2019 2470   Risk Factors  0 Filed at: 03/02/2019 0951   Troponin  0 Filed at: 03/02/2019 0951   HEART Score  0 Filed at: 03/02/2019 8415   HEART Score  0 Filed at: 03/02/2019 1541                            MDM    Disposition  Final diagnoses:   Abdominal pain   Chest pain   Hiatal hernia   Ovarian cyst - left     Time reflects when diagnosis was documented in both MDM as applicable and the Disposition within this note     Time User Action Codes Description Comment    3/2/2019 11:05 AM Lexi WHEELER Add [R10 9] Abdominal pain     3/2/2019 11:05 AM Lexi Emerylatesha WHEELER Add [R07 9] Chest pain     3/2/2019 11:05 AM Lexi WHEELER Add [K44 9] Hiatal hernia     3/2/2019 11:05 AM Lexi Anne LIAM Add [R05 435] Ovarian cyst     3/2/2019 11:05 AM Janeth Labor Modify [Q95 486] Ovarian cyst left      ED Disposition     ED Disposition Condition Date/Time Comment    Discharge Good Sat Mar 2, 2019 11:05 AM Jaclyn Noguera discharge to home/self care  Follow-up Information     Follow up With Specialties Details Why Contact Info    Solitario Guaman DO Family Medicine Schedule an appointment as soon as possible for a visit   [address] [facility] [address]  [phone]      Mehrdad Rasheed MD Gastroenterology Schedule an appointment as soon as possible for a visit   [address] [facility] [address]            Patient's Medications   Discharge Prescriptions    OMEPRAZOLE (PRILOSEC) 20 MG DELAYED RELEASE CAPSULE    Take 1 capsule (20 mg total) by mouth daily       Start Date: 3/2/2019  End Date: --       Order Dose: 20 mg       Quantity: 20 capsule    Refills: 0     No discharge procedures on file      ED Provider  Electronically Signed by           Reny Hernandez PA-C  03/02/19 9711

## 2019-03-02 NOTE — ED PROCEDURE NOTE
Procedure  ECG 12 Lead Documentation  Date/Time: 3/2/2019 9:50 AM  Performed by: Gladys Oneill PA-C  Authorized by: Gladys Oneill PA-C     Indications / Diagnosis:  Chest pain  ECG reviewed by me, the ED Provider: yes    Patient location:  ED  Previous ECG:     Comparison to cardiac monitor: Yes    Interpretation:     Interpretation: normal    Rate:     ECG rate:  85    ECG rate assessment: normal    Rhythm:     Rhythm: sinus rhythm    Ectopy:     Ectopy: none    QRS:     QRS axis:  Normal    QRS intervals:  Normal  Conduction:     Conduction: normal    ST segments:     ST segments:  Normal  T waves:     T waves: normal                       Gladys Oneill PA-C  03/02/19 3935

## 2019-03-05 LAB
ATRIAL RATE: 85 BPM
P AXIS: 63 DEGREES
PR INTERVAL: 114 MS
QRS AXIS: 86 DEGREES
QRSD INTERVAL: 78 MS
QT INTERVAL: 366 MS
QTC INTERVAL: 435 MS
T WAVE AXIS: 59 DEGREES
VENTRICULAR RATE: 85 BPM

## 2019-03-05 PROCEDURE — 93010 ELECTROCARDIOGRAM REPORT: CPT | Performed by: INTERNAL MEDICINE

## 2023-11-15 ENCOUNTER — HOSPITAL ENCOUNTER (EMERGENCY)
Facility: HOSPITAL | Age: 28
Discharge: HOME | End: 2023-11-15
Attending: FAMILY MEDICINE
Payer: COMMERCIAL

## 2023-11-15 ENCOUNTER — APPOINTMENT (EMERGENCY)
Dept: ULTRASOUND IMAGING | Facility: HOSPITAL | Age: 28
End: 2023-11-15
Payer: COMMERCIAL

## 2023-11-15 VITALS
SYSTOLIC BLOOD PRESSURE: 158 MMHG | TEMPERATURE: 97.6 F | OXYGEN SATURATION: 99 % | HEART RATE: 67 BPM | DIASTOLIC BLOOD PRESSURE: 68 MMHG | RESPIRATION RATE: 18 BRPM

## 2023-11-15 DIAGNOSIS — N93.9 VAGINAL BLEEDING: Primary | ICD-10-CM

## 2023-11-15 DIAGNOSIS — O20.0 THREATENED MISCARRIAGE IN EARLY PREGNANCY: ICD-10-CM

## 2023-11-15 LAB
ABO GROUP BLD: NORMAL
ABO GROUP BLD: NORMAL
ANION GAP SERPL CALCULATED.3IONS-SCNC: 9 MMOL/L
B-HCG SERPL-ACNC: 729 MIU/ML (ref 0–5)
BACTERIA UR QL AUTO: NORMAL /HPF
BASOPHILS # BLD AUTO: 0.08 THOUSANDS/ÂΜL (ref 0–0.1)
BASOPHILS NFR BLD AUTO: 1 % (ref 0–1)
BILIRUB UR QL STRIP: NEGATIVE
BLD GP AB SCN SERPL QL: NEGATIVE
BUN SERPL-MCNC: 10 MG/DL (ref 5–25)
CALCIUM SERPL-MCNC: 9.4 MG/DL (ref 8.4–10.2)
CHLORIDE SERPL-SCNC: 102 MMOL/L (ref 96–108)
CLARITY UR: CLEAR
CO2 SERPL-SCNC: 26 MMOL/L (ref 21–32)
COLOR UR: ABNORMAL
CREAT SERPL-MCNC: 0.79 MG/DL (ref 0.6–1.3)
EOSINOPHIL # BLD AUTO: 0.58 THOUSAND/ÂΜL (ref 0–0.61)
EOSINOPHIL NFR BLD AUTO: 6 % (ref 0–6)
ERYTHROCYTE [DISTWIDTH] IN BLOOD BY AUTOMATED COUNT: 11.5 % (ref 11.6–15.1)
GFR SERPL CREATININE-BSD FRML MDRD: 102 ML/MIN/1.73SQ M
GLUCOSE SERPL-MCNC: 85 MG/DL (ref 65–140)
GLUCOSE UR STRIP-MCNC: NEGATIVE MG/DL
HCT VFR BLD AUTO: 39.5 % (ref 34.8–46.1)
HGB BLD-MCNC: 13 G/DL (ref 11.5–15.4)
HGB UR QL STRIP.AUTO: ABNORMAL
IMM GRANULOCYTES # BLD AUTO: 0.03 THOUSAND/UL (ref 0–0.2)
IMM GRANULOCYTES NFR BLD AUTO: 0 % (ref 0–2)
KETONES UR STRIP-MCNC: NEGATIVE MG/DL
LEUKOCYTE ESTERASE UR QL STRIP: NEGATIVE
LYMPHOCYTES # BLD AUTO: 3.35 THOUSANDS/ÂΜL (ref 0.6–4.47)
LYMPHOCYTES NFR BLD AUTO: 33 % (ref 14–44)
MCH RBC QN AUTO: 30.3 PG (ref 26.8–34.3)
MCHC RBC AUTO-ENTMCNC: 32.9 G/DL (ref 31.4–37.4)
MCV RBC AUTO: 92 FL (ref 82–98)
MONOCYTES # BLD AUTO: 0.52 THOUSAND/ÂΜL (ref 0.17–1.22)
MONOCYTES NFR BLD AUTO: 5 % (ref 4–12)
NEUTROPHILS # BLD AUTO: 5.71 THOUSANDS/ÂΜL (ref 1.85–7.62)
NEUTS SEG NFR BLD AUTO: 55 % (ref 43–75)
NITRITE UR QL STRIP: NEGATIVE
NON-SQ EPI CELLS URNS QL MICRO: NORMAL /HPF
NRBC BLD AUTO-RTO: 0 /100 WBCS
PH UR STRIP.AUTO: 6 [PH]
PLATELET # BLD AUTO: 346 THOUSANDS/UL (ref 149–390)
PMV BLD AUTO: 9.9 FL (ref 8.9–12.7)
POTASSIUM SERPL-SCNC: 3.7 MMOL/L (ref 3.5–5.3)
PROT UR STRIP-MCNC: NEGATIVE MG/DL
RBC # BLD AUTO: 4.29 MILLION/UL (ref 3.81–5.12)
RBC #/AREA URNS AUTO: NORMAL /HPF
RH BLD: POSITIVE
RH BLD: POSITIVE
SODIUM SERPL-SCNC: 137 MMOL/L (ref 135–147)
SP GR UR STRIP.AUTO: <=1.005
SPECIMEN EXPIRATION DATE: NORMAL
UROBILINOGEN UR QL STRIP.AUTO: 0.2 E.U./DL
WBC # BLD AUTO: 10.27 THOUSAND/UL (ref 4.31–10.16)
WBC #/AREA URNS AUTO: NORMAL /HPF

## 2023-11-15 PROCEDURE — 36415 COLL VENOUS BLD VENIPUNCTURE: CPT | Performed by: FAMILY MEDICINE

## 2023-11-15 PROCEDURE — 84702 CHORIONIC GONADOTROPIN TEST: CPT | Performed by: FAMILY MEDICINE

## 2023-11-15 PROCEDURE — 76815 OB US LIMITED FETUS(S): CPT

## 2023-11-15 PROCEDURE — 86900 BLOOD TYPING SEROLOGIC ABO: CPT | Performed by: FAMILY MEDICINE

## 2023-11-15 PROCEDURE — 87086 URINE CULTURE/COLONY COUNT: CPT | Performed by: FAMILY MEDICINE

## 2023-11-15 PROCEDURE — 81003 URINALYSIS AUTO W/O SCOPE: CPT | Performed by: FAMILY MEDICINE

## 2023-11-15 PROCEDURE — 86901 BLOOD TYPING SEROLOGIC RH(D): CPT | Performed by: FAMILY MEDICINE

## 2023-11-15 PROCEDURE — 99285 EMERGENCY DEPT VISIT HI MDM: CPT | Performed by: FAMILY MEDICINE

## 2023-11-15 PROCEDURE — 85025 COMPLETE CBC W/AUTO DIFF WBC: CPT | Performed by: FAMILY MEDICINE

## 2023-11-15 PROCEDURE — 86850 RBC ANTIBODY SCREEN: CPT | Performed by: FAMILY MEDICINE

## 2023-11-15 PROCEDURE — 80048 BASIC METABOLIC PNL TOTAL CA: CPT | Performed by: FAMILY MEDICINE

## 2023-11-15 PROCEDURE — 81001 URINALYSIS AUTO W/SCOPE: CPT | Performed by: FAMILY MEDICINE

## 2023-11-15 PROCEDURE — 99284 EMERGENCY DEPT VISIT MOD MDM: CPT

## 2023-11-15 NOTE — ED PROVIDER NOTES
History  Chief Complaint   Patient presents with    Vaginal Bleeding     Patient reports she is 7 weeks pregnant. Patient reports heavy vaginal bleeding and intermittent abdominal cramps starting two weeks ago. HPI  79-year-old female G1, P0 presented to ED with the complaint of vaginal bleeding. Patient reports her last menstrual period was the end of September patient is approximately 7 weeks pregnant. Patient reports heavy vaginal bleeding that started to since yesterday. Patient states she had intermittent bleeding was seen at another ED and was also seen by her OB/GYN. Patient states there was a concern for ectopic good due to an improper rise in hCG. Patient states she had some cramping this morning called her OB who recommended come to the ED. Patient states that she is wearing pantiliners however does not have abdominal pain nausea or vomiting. Denies any dizziness. Prior to Admission Medications   Prescriptions Last Dose Informant Patient Reported? Taking?   busPIRone (BUSPAR) 5 mg tablet   No No   Sig: Take 1 tablet (5 mg total) by mouth 3 (three) times a day   Patient not taking: Reported on 3/2/2019   omeprazole (PriLOSEC) 20 mg delayed release capsule   No No   Sig: Take 1 capsule (20 mg total) by mouth daily      Facility-Administered Medications: None       Past Medical History:   Diagnosis Date    Anxiety disorder     Social anxiety disorder        History reviewed. No pertinent surgical history. Family History   Problem Relation Age of Onset    Fibromyalgia Mother     Diabetes Father         type 2    Hypertension Father      I have reviewed and agree with the history as documented.     E-Cigarette/Vaping     E-Cigarette/Vaping Substances     Social History     Tobacco Use    Smoking status: Former     Packs/day: 1.00     Types: Cigarettes    Smokeless tobacco: Never   Substance Use Topics    Alcohol use: No    Drug use: No       Review of Systems   Constitutional:  Negative for chills and fever. HENT:  Negative for rhinorrhea and sore throat. Eyes:  Negative for visual disturbance. Respiratory:  Negative for cough and shortness of breath. Cardiovascular:  Negative for chest pain and leg swelling. Gastrointestinal:  Negative for abdominal pain, diarrhea, nausea and vomiting. Genitourinary:  Positive for vaginal bleeding. Negative for dysuria. Musculoskeletal:  Negative for back pain and myalgias. Skin:  Negative for rash. Neurological:  Negative for dizziness and headaches. Psychiatric/Behavioral:  Negative for confusion. All other systems reviewed and are negative. Physical Exam  Physical Exam  Vitals and nursing note reviewed. Constitutional:       Appearance: She is well-developed. HENT:      Head: Normocephalic and atraumatic. Right Ear: External ear normal.      Left Ear: External ear normal.      Nose: Nose normal.      Mouth/Throat:      Mouth: Mucous membranes are moist.      Pharynx: No oropharyngeal exudate. Eyes:      General: No scleral icterus. Right eye: No discharge. Left eye: No discharge. Conjunctiva/sclera: Conjunctivae normal.      Pupils: Pupils are equal, round, and reactive to light. Cardiovascular:      Rate and Rhythm: Normal rate and regular rhythm. Pulses: Normal pulses. Heart sounds: Normal heart sounds. Pulmonary:      Effort: Pulmonary effort is normal. No respiratory distress. Breath sounds: Normal breath sounds. No wheezing. Abdominal:      General: Bowel sounds are normal.      Palpations: Abdomen is soft. Musculoskeletal:         General: Normal range of motion. Cervical back: Normal range of motion and neck supple. Lymphadenopathy:      Cervical: No cervical adenopathy. Skin:     General: Skin is warm and dry. Capillary Refill: Capillary refill takes less than 2 seconds. Neurological:      General: No focal deficit present.       Mental Status: She is alert and oriented to person, place, and time.    Psychiatric:         Mood and Affect: Mood normal.         Behavior: Behavior normal.         Vital Signs  ED Triage Vitals [11/15/23 1732]   Temperature Pulse Respirations Blood Pressure SpO2   97.6 °F (36.4 °C) 67 18 158/68 99 %      Temp Source Heart Rate Source Patient Position - Orthostatic VS BP Location FiO2 (%)   Tympanic Monitor Sitting Left arm --      Pain Score       No Pain           Vitals:    11/15/23 1732   BP: 158/68   Pulse: 67   Patient Position - Orthostatic VS: Sitting         Visual Acuity  Visual Acuity      Flowsheet Row Most Recent Value   L Pupil Size (mm) 3   R Pupil Size (mm) 3            ED Medications  Medications - No data to display    Diagnostic Studies  Results Reviewed       Procedure Component Value Units Date/Time    Basic metabolic panel [[id number]] Collected: 11/15/23 1833    Lab Status: Final result Specimen: Blood from Arm, Right Updated: 11/15/23 1912     Sodium 137 mmol/L      Potassium 3.7 mmol/L      Chloride 102 mmol/L      CO2 26 mmol/L      ANION GAP 9 mmol/L      BUN 10 mg/dL      Creatinine 0.79 mg/dL      Glucose 85 mg/dL      Calcium 9.4 mg/dL      eGFR 102 ml/min/1.73sq m     Narrative:      [place] guidelines for Chronic Kidney Disease (CKD):     Stage 1 with normal or high GFR (GFR > 90 mL/min/1.73 square meters)    Stage 2 Mild CKD (GFR = 60-89 mL/min/1.73 square meters)    Stage 3A Moderate CKD (GFR = 45-59 mL/min/1.73 square meters)    Stage 3B Moderate CKD (GFR = 30-44 mL/min/1.73 square meters)    Stage 4 Severe CKD (GFR = 15-29 mL/min/1.73 square meters)    Stage 5 End Stage CKD (GFR <15 mL/min/1.73 square meters)  Note: GFR calculation is accurate only with a steady state creatinine    Quantitative hCG [[id number]]  (Abnormal) Collected: 11/15/23 1833    Lab Status: Final result Specimen: Blood from Arm, Right Updated: 11/15/23 1912     HCG, Quant 729 mIU/mL     Narrative:       Expected Ranges:    HCG results between 5 and 25 mIU/mL may be indicative of early pregnancy but should be interpreted in light of the total clinical presentation. HCG can rise to detectable levels in kyaw and post menopausal women (0-11.6 mIU/mL).      Approximate               Approximate HCG  Gestation age          Concentration ( mIU/mL)  _____________          ______________________   Eleuterio Chinchilla                      HCG values  0.2-1                       5-50  1-2                           2-3                         100-5000  3-4                         500-20877  4-5                         1000-51104  5-6                         [id number]  6-8                         48058-678686  8-12                        [id number]      CBC and differential [[id number]]  (Abnormal) Collected: 11/15/23 1833    Lab Status: Final result Specimen: Blood from Arm, Right Updated: 11/15/23 1843     WBC 10.27 Thousand/uL      RBC 4.29 Million/uL      Hemoglobin 13.0 g/dL      Hematocrit 39.5 %      MCV 92 fL      MCH 30.3 pg      MCHC 32.9 g/dL      RDW 11.5 %      MPV 9.9 fL      Platelets 193 Thousands/uL      nRBC 0 /100 WBCs      Neutrophils Relative 55 %      Immat GRANS % 0 %      Lymphocytes Relative 33 %      Monocytes Relative 5 %      Eosinophils Relative 6 %      Basophils Relative 1 %      Neutrophils Absolute 5.71 Thousands/µL      Immature Grans Absolute 0.03 Thousand/uL      Lymphocytes Absolute 3.35 Thousands/µL      Monocytes Absolute 0.52 Thousand/µL      Eosinophils Absolute 0.58 Thousand/µL      Basophils Absolute 0.08 Thousands/µL     Urine Microscopic [[id number]] Collected: 11/15/23 1803    Lab Status: Final result Specimen: Urine, Clean Catch Updated: 11/15/23 1842     RBC, UA 0-1 /hpf      WBC, UA 0-1 /hpf      Epithelial Cells Occasional /hpf      Bacteria, UA Occasional /hpf      URINE COMMENT --    UA w Reflex to Microscopic w Reflex to Culture [[id number]]  (Abnormal) Collected: 11/15/23 1803    Lab Status: Final result Specimen: Urine, Clean Catch Updated: 11/15/23 1815     Color, UA Straw     Clarity, UA Clear     Specific Gravity, UA <=1.005     pH, UA 6.0     Leukocytes, UA Negative     Nitrite, UA Negative     Protein, UA Negative mg/dl      Glucose, UA Negative mg/dl      Ketones, UA Negative mg/dl      Urobilinogen, UA 0.2 E.U./dl      Bilirubin, UA Negative     Occult Blood, UA 3+     URINE COMMENT --    Urine culture [[id number]] Collected: 11/15/23 1803    Lab Status: In process Specimen: Urine, Clean Catch Updated: 11/15/23 1815                    OB pregnancy limited with transvaginal   Final Result by Xavier Porter MD (11/15 1901)   No intrauterine gestation or adnexal mass identified. Differential remains early IUP, spontaneous  and ectopic pregnancy. Correlate with serial quantitative BHCG. Workstation performed: [id number]                    Procedures  Procedures         ED Course  ED Course as of 11/15/23 2016   Wed Nov 15, 2023   1908 IMPRESSION:  No intrauterine gestation or adnexal mass identified. Differential remains early IUP, spontaneous  and ectopic pregnancy. Correlate with serial quantitative BHCG. [id number] [address] Attending ([facility]))  KRIS. Sounds like she can follow up outpatient? Does she have someone outpatient who is ordering HCGs?  7:48 PM  20 days left      [facility]- SOD Attending ([facility]))  KRIS. She can follow up with them for additional HCG  7:50 PM  20 days left                                 SBIRT 22yo+      Flowsheet Row Most Recent Value   Initial Alcohol Screen: US AUDIT-C     1. How often do you have a drink containing alcohol? 0 Filed at: 11/15/2023 1738   2. How many drinks containing alcohol do you have on a typical day you are drinking? 0 Filed at: 11/15/2023 1738   3a. Male UNDER 65: How often do you have five or more drinks on one occasion?  0 Filed at: 11/15/2023 1738   3b. FEMALE Any Age, or MALE 65+: How often do you have 4 or more drinks on one occassion? 0 Filed at: 11/15/2023 1738   Audit-C Score 0 Filed at: 11/15/2023 1738   NAT: How many times in the past year have you. .. Used an illegal drug or used a prescription medication for non-medical reasons? Never Filed at: 11/15/2023 1738                      Medical Decision Making  Patient with history as above presented with vaginal bleeding during pregnancy. Pt is a 39-year-old female G1, P0 presented to ED with the complaint of vaginal bleeding. Patient reports her last menstrual period was the end of September patient is approximately 7 weeks pregnant  History is taken from patient. Patient was nontoxic, stable. Ambulatory. Exam as above. Blood pressure was not elevated. Labs reviewed. Hemoglobin stable. Rh O+    Reviewed external records. Differential diagnosis considered. Overall presentation is consistent with threatened . Low suspicion for septic , ovarian torsion, ectopic pregnancy. Patient was treated with fluid with improvement in symptoms. Consideration was given for admission, but the patient was stable for outpatient management. Case discussed with OB/GYN on-call please see my ED course for documentation and recommending outpatient follow-up outpatient repeat hCG in 48 hours. This was discussed with patient and significant other who is at bedside and significant detail. Discussed with the patient return to the ED if noticed any abdominal pain increased bleeding nausea vomiting dizziness immediate return back to the ED. Have talked to patient regarding ectopic pregnancy and the complication. Patient verbalized understand the plan. She states that she will talk to her OB/GYN and follow-up    Disposition: Discussed need to follow up diagnostics, including incidental findings.  Discharged with instructions to obtain outpatient OB follow up of patient's symptoms and findings, with strict return precautions if patient develops new or worsening symptoms. Amount and/or Complexity of Data Reviewed  Labs: ordered. Radiology: ordered. Discussion of management or test interpretation with external provider(s): [facility]- SOD Attending ([facility]))  KRIS. Sounds like she can follow up outpatient? Does she have someone outpatient who is ordering HCGs?  7:48 PM  20 days left  yes she had [facility] ob[facility]  7:49 PM  20 days left  Read  [facility]- SOD Attending ([facility]))  KRIS. She can follow up with them for additional HCG  7:50 PM  20 days left  ok so sounds li             Disposition  Final diagnoses:   Vaginal bleeding   Threatened miscarriage in early pregnancy     Time reflects when diagnosis was documented in both MDM as applicable and the Disposition within this note       Time User Action Codes Description Comment    11/15/2023  6:52 PM Deboraha Niece Add [N93.9] Vaginal bleeding     11/15/2023  6:52 PM Deboraha Niece Add [O20.0] Threatened miscarriage in early pregnancy           ED Disposition       ED Disposition   Discharge    Condition   Stable    Date/Time   Wed Nov 15, 2023 230 [address] [place] discharge to home/self care. Follow-up Information       Follow up With Specialties Details Why Contact Info    obgyn  Schedule an appointment as soon as possible for a visit in 2 days If symptoms worsen             Discharge Medication List as of 11/15/2023  7:52 PM        CONTINUE these medications which have NOT CHANGED    Details   busPIRone (BUSPAR) 5 mg tablet Take 1 tablet (5 mg total) by mouth 3 (three) times a day, Starting Wed 8/15/2018, Normal      omeprazole (PriLOSEC) 20 mg delayed release capsule Take 1 capsule (20 mg total) by mouth daily, Starting Sat 3/2/2019, Print             No discharge procedures on file.     PDMP Review       None            ED Provider  Electronically Signed by             Margareth Arita MD  11/15/23 2016 intact

## 2023-11-15 NOTE — Clinical Note
Juan Noyolaon was seen and treated in our emergency department on 11/15/2023. Diagnosis:     Dhara Pratt  may return to work on return date. She may return on this date: 11/20/2023         If you have any questions or concerns, please don't hesitate to call.       Ruma Baltazar MD    ______________________________           _______________          _______________  [facility] Representative                              Date                                Time

## 2023-11-16 LAB — BACTERIA UR CULT: NORMAL

## 2025-04-16 ENCOUNTER — OFFICE VISIT (OUTPATIENT)
Dept: FAMILY MEDICINE CLINIC | Facility: CLINIC | Age: 30
End: 2025-04-16
Payer: COMMERCIAL

## 2025-04-16 VITALS
WEIGHT: 124 LBS | BODY MASS INDEX: 22.82 KG/M2 | OXYGEN SATURATION: 98 % | SYSTOLIC BLOOD PRESSURE: 120 MMHG | TEMPERATURE: 98.3 F | DIASTOLIC BLOOD PRESSURE: 72 MMHG | HEIGHT: 62 IN | HEART RATE: 80 BPM

## 2025-04-16 DIAGNOSIS — A60.00 GENITAL HERPES SIMPLEX, UNSPECIFIED SITE: ICD-10-CM

## 2025-04-16 DIAGNOSIS — Z76.89 ENCOUNTER TO ESTABLISH CARE WITH NEW DOCTOR: ICD-10-CM

## 2025-04-16 DIAGNOSIS — Z13.220 SCREENING FOR HYPERLIPIDEMIA: ICD-10-CM

## 2025-04-16 DIAGNOSIS — Z13.0 SCREENING FOR BLOOD DISEASE: ICD-10-CM

## 2025-04-16 DIAGNOSIS — Z13.29 SCREENING FOR THYROID DISORDER: ICD-10-CM

## 2025-04-16 DIAGNOSIS — L21.0 DANDRUFF: Primary | ICD-10-CM

## 2025-04-16 DIAGNOSIS — Z01.419 WOMEN'S ANNUAL ROUTINE GYNECOLOGICAL EXAMINATION: ICD-10-CM

## 2025-04-16 DIAGNOSIS — Z13.1 SCREENING FOR DIABETES MELLITUS: ICD-10-CM

## 2025-04-16 PROCEDURE — 99204 OFFICE O/P NEW MOD 45 MIN: CPT | Performed by: FAMILY MEDICINE

## 2025-04-16 RX ORDER — VALACYCLOVIR HYDROCHLORIDE 1 G/1
TABLET, FILM COATED ORAL
Qty: 30 TABLET | Refills: 0 | Status: SHIPPED | OUTPATIENT
Start: 2025-04-16 | End: 2026-04-16

## 2025-04-16 RX ORDER — KETOCONAZOLE 20 MG/ML
1 SHAMPOO, SUSPENSION TOPICAL 2 TIMES WEEKLY
Qty: 120 ML | Refills: 0 | Status: SHIPPED | OUTPATIENT
Start: 2025-04-17

## 2025-04-16 NOTE — PROGRESS NOTES
"Name: Brie Hanson      : 1995      MRN: [id number]  Encounter Provider: Shree Romero MD  Encounter Date: 2025   Encounter department: [facility] AT [place]  :  Assessment & Plan  Dandruff    Orders:    ketoconazole (NIZORAL) 2 % shampoo; Apply 1 Application topically 2 (two) times a week    Women's annual routine gynecological examination    Orders:    Ambulatory Referral to Obstetrics / Gynecology; Future    Screening for hyperlipidemia    Orders:    Lipid panel; Future    Screening for diabetes mellitus    Orders:    Comprehensive metabolic panel; Future    Screening for blood disease    Orders:    CBC and differential; Future    Screening for thyroid disorder    Orders:    TSH, 3rd generation with Free T4 reflex; Future    Genital herpes simplex, unspecified site    Orders:    valACYclovir (VALTREX) 1,000 mg tablet; Take 1 tablet every 12 hours for 3 days; start as soon as possible within 24 hours of symptom onset.    Encounter to establish care with new doctor  Will have patient get lab work and follow-up in a few weeks for annual exam.              History of Present Illness   Patient presents office today to establish care.  Has history of genital herpes and would like refill of Valtrex.  Has a few episodes or outbreaks a year.  In the past she took Valtrex for this and it went away fairly quickly.  She also would like to discuss dandruff.  She has had an issue for a while now and has not gotten any better with over-the-counter shampoo.   She was previously on BuSpar but reports that her anxiety is well-controlled now.  She has some questions about conceiving.  She was previously seeing OB/GYN but reports that her insurance no longer covers that provider and she needs to switch.      Review of Systems   All other systems reviewed and are negative.      Objective   /72   Pulse 80   Temp 98.3 °F (36.8 °C) (Tympanic)   Ht 5' 2\" (1.575 m)   Wt 56.2 kg (124 lb)   " SpO2 98%   BMI 22.68 kg/m²      Physical Exam  Vitals and nursing note reviewed.   Constitutional:       General: She is not in acute distress.     Appearance: Normal appearance. She is well-developed. She is not ill-appearing, toxic-appearing or diaphoretic.   HENT:      Head: Normocephalic and atraumatic.   Eyes:      General:         Right eye: No discharge.         Left eye: No discharge.      Extraocular Movements: Extraocular movements intact.      Conjunctiva/sclera: Conjunctivae normal.   Cardiovascular:      Rate and Rhythm: Normal rate.   Pulmonary:      Effort: Pulmonary effort is normal.   Skin:     General: Skin is warm and dry.      Capillary Refill: Capillary refill takes less than 2 seconds.   Neurological:      Mental Status: She is alert and oriented to person, place, and time.   Psychiatric:         Mood and Affect: Mood normal.         Behavior: Behavior normal.         Thought Content: Thought content normal.         Judgment: Judgment normal.

## 2025-05-23 ENCOUNTER — OFFICE VISIT (OUTPATIENT)
Dept: FAMILY MEDICINE CLINIC | Facility: CLINIC | Age: 30
End: 2025-05-23
Payer: COMMERCIAL

## 2025-05-23 VITALS
WEIGHT: 121.6 LBS | DIASTOLIC BLOOD PRESSURE: 78 MMHG | OXYGEN SATURATION: 99 % | HEART RATE: 70 BPM | BODY MASS INDEX: 22.38 KG/M2 | HEIGHT: 62 IN | TEMPERATURE: 97.9 F | RESPIRATION RATE: 18 BRPM | SYSTOLIC BLOOD PRESSURE: 118 MMHG

## 2025-05-23 DIAGNOSIS — K64.9 HEMORRHOIDS, UNSPECIFIED HEMORRHOID TYPE: Primary | ICD-10-CM

## 2025-05-23 PROCEDURE — 99213 OFFICE O/P EST LOW 20 MIN: CPT | Performed by: FAMILY MEDICINE

## 2025-05-23 RX ORDER — HYDROCORTISONE 25 MG/G
CREAM TOPICAL 2 TIMES DAILY
Qty: 28 G | Refills: 0 | Status: SHIPPED | OUTPATIENT
Start: 2025-05-23

## 2025-05-23 NOTE — LETTER
May 23, 2025     Patient: Brie Hanson  YOB: 1995  Date of Visit: 5/23/2025      To Whom it May Concern:    Brie Hanson is under my professional care. Brie was seen in my office on 5/23/2025.  Please allow her to use donut to sit on . It is medically neccessary .   If you have any questions or concerns, please don't hesitate to call.         Sincerely,          Shree Romero MD        CC: No Recipients

## 2025-05-28 NOTE — PROGRESS NOTES
"Name: Brie Hanson      : 1995      MRN: [id number]  Encounter Provider: Shree Romero MD  Encounter Date: 2025   Encounter department: [facility] AT [place]    :  Assessment & Plan  Hemorrhoids, unspecified hemorrhoid type  Physiology of hemorrhoids discussed.  Encourage adequate hydration and fiber.  Start Anusol cream.  Follow-up with colorectal surgery as needed if no improvement.  Orders:    hydrocortisone (ANUSOL-HC) 2.5 % rectal cream; Apply topically 2 (two) times a day    Ambulatory Referral to Colorectal Surgery; Future      Assessment & Plan             History of Present Illness     History of Present Illness  Patient presents office today for concerns of hemorrhoid.  She is not sure what it is but has had rectal pain for about a week now.  No blood in stool.  Is uncomfortable with defecation.  Has tried over-the-counter medications which elo Tucks but no improvement.  No constitutional symptoms.  No nausea or vomiting.  No unprotected sex.  Has history of straining with bowel movements.     Review of Systems   All other systems reviewed and are negative.    Objective   /78 (BP Location: Left arm, Patient Position: Sitting, Cuff Size: Standard)   Pulse 70   Temp 97.9 °F (36.6 °C) (Tympanic)   Resp 18   Ht 5' 2\" (1.575 m)   Wt 55.2 kg (121 lb 9.6 oz)   SpO2 99%   BMI 22.24 kg/m²     Physical Exam    Physical Exam  Vitals and nursing note reviewed. Exam conducted with a chaperone present.   Constitutional:       General: She is not in acute distress.     Appearance: Normal appearance. She is well-developed. She is not ill-appearing, toxic-appearing or diaphoretic.   HENT:      Head: Normocephalic and atraumatic.     Eyes:      General:         Right eye: No discharge.         Left eye: No discharge.      Extraocular Movements: Extraocular movements intact.      Conjunctiva/sclera: Conjunctivae normal.       Cardiovascular:      Rate and Rhythm: Normal " rate.   Pulmonary:      Effort: Pulmonary effort is normal.   Genitourinary:     Comments:  External Hemorid    Skin:     General: Skin is warm and dry.      Capillary Refill: Capillary refill takes less than 2 seconds.     Neurological:      Mental Status: She is alert and oriented to person, place, and time.     Psychiatric:         Mood and Affect: Mood normal.         Behavior: Behavior normal.         Thought Content: Thought content normal.         Judgment: Judgment normal.

## 2025-06-16 ENCOUNTER — APPOINTMENT (OUTPATIENT)
Dept: LAB | Facility: CLINIC | Age: 30
End: 2025-06-16
Attending: FAMILY MEDICINE
Payer: COMMERCIAL

## 2025-06-16 DIAGNOSIS — Z13.0 SCREENING FOR BLOOD DISEASE: ICD-10-CM

## 2025-06-16 DIAGNOSIS — Z13.220 SCREENING FOR HYPERLIPIDEMIA: ICD-10-CM

## 2025-06-16 DIAGNOSIS — Z13.1 SCREENING FOR DIABETES MELLITUS: ICD-10-CM

## 2025-06-16 DIAGNOSIS — Z13.29 SCREENING FOR THYROID DISORDER: ICD-10-CM

## 2025-06-16 LAB
ALBUMIN SERPL BCG-MCNC: 4.8 G/DL (ref 3.5–5)
ALP SERPL-CCNC: 72 U/L (ref 34–104)
ALT SERPL W P-5'-P-CCNC: 4 U/L (ref 7–52)
ANION GAP SERPL CALCULATED.3IONS-SCNC: 8 MMOL/L (ref 4–13)
AST SERPL W P-5'-P-CCNC: 16 U/L (ref 13–39)
BASOPHILS # BLD AUTO: 0.04 THOUSANDS/ÂΜL (ref 0–0.1)
BASOPHILS NFR BLD AUTO: 1 % (ref 0–1)
BILIRUB SERPL-MCNC: 1.34 MG/DL (ref 0.2–1)
BUN SERPL-MCNC: 11 MG/DL (ref 5–25)
CALCIUM SERPL-MCNC: 9.4 MG/DL (ref 8.4–10.2)
CHLORIDE SERPL-SCNC: 103 MMOL/L (ref 96–108)
CHOLEST SERPL-MCNC: 163 MG/DL (ref ?–200)
CO2 SERPL-SCNC: 27 MMOL/L (ref 21–32)
CREAT SERPL-MCNC: 0.71 MG/DL (ref 0.6–1.3)
EOSINOPHIL # BLD AUTO: 0.29 THOUSAND/ÂΜL (ref 0–0.61)
EOSINOPHIL NFR BLD AUTO: 6 % (ref 0–6)
ERYTHROCYTE [DISTWIDTH] IN BLOOD BY AUTOMATED COUNT: 11.9 % (ref 11.6–15.1)
GFR SERPL CREATININE-BSD FRML MDRD: 114 ML/MIN/1.73SQ M
GLUCOSE P FAST SERPL-MCNC: 83 MG/DL (ref 65–99)
HCT VFR BLD AUTO: 43.3 % (ref 34.8–46.1)
HDLC SERPL-MCNC: 58 MG/DL
HGB BLD-MCNC: 13.9 G/DL (ref 11.5–15.4)
IMM GRANULOCYTES # BLD AUTO: 0.01 THOUSAND/UL (ref 0–0.2)
IMM GRANULOCYTES NFR BLD AUTO: 0 % (ref 0–2)
LDLC SERPL CALC-MCNC: 82 MG/DL (ref 0–100)
LYMPHOCYTES # BLD AUTO: 1.71 THOUSANDS/ÂΜL (ref 0.6–4.47)
LYMPHOCYTES NFR BLD AUTO: 35 % (ref 14–44)
MCH RBC QN AUTO: 30.8 PG (ref 26.8–34.3)
MCHC RBC AUTO-ENTMCNC: 32.1 G/DL (ref 31.4–37.4)
MCV RBC AUTO: 96 FL (ref 82–98)
MONOCYTES # BLD AUTO: 0.29 THOUSAND/ÂΜL (ref 0.17–1.22)
MONOCYTES NFR BLD AUTO: 6 % (ref 4–12)
NEUTROPHILS # BLD AUTO: 2.57 THOUSANDS/ÂΜL (ref 1.85–7.62)
NEUTS SEG NFR BLD AUTO: 52 % (ref 43–75)
NONHDLC SERPL-MCNC: 105 MG/DL
NRBC BLD AUTO-RTO: 0 /100 WBCS
PLATELET # BLD AUTO: 335 THOUSANDS/UL (ref 149–390)
PMV BLD AUTO: 11.2 FL (ref 8.9–12.7)
POTASSIUM SERPL-SCNC: 4.4 MMOL/L (ref 3.5–5.3)
PROT SERPL-MCNC: 7.5 G/DL (ref 6.4–8.4)
RBC # BLD AUTO: 4.52 MILLION/UL (ref 3.81–5.12)
SODIUM SERPL-SCNC: 138 MMOL/L (ref 135–147)
TRIGL SERPL-MCNC: 116 MG/DL (ref ?–150)
TSH SERPL DL<=0.05 MIU/L-ACNC: 0.87 UIU/ML (ref 0.45–4.5)
WBC # BLD AUTO: 4.91 THOUSAND/UL (ref 4.31–10.16)

## 2025-06-16 PROCEDURE — 80061 LIPID PANEL: CPT

## 2025-06-16 PROCEDURE — 36415 COLL VENOUS BLD VENIPUNCTURE: CPT

## 2025-06-16 PROCEDURE — 84443 ASSAY THYROID STIM HORMONE: CPT

## 2025-06-16 PROCEDURE — 80053 COMPREHEN METABOLIC PANEL: CPT

## 2025-06-16 PROCEDURE — 85025 COMPLETE CBC W/AUTO DIFF WBC: CPT

## 2025-06-17 ENCOUNTER — RESULTS FOLLOW-UP (OUTPATIENT)
Dept: FAMILY MEDICINE CLINIC | Facility: CLINIC | Age: 30
End: 2025-06-17

## 2025-06-20 ENCOUNTER — OFFICE VISIT (OUTPATIENT)
Dept: FAMILY MEDICINE CLINIC | Facility: CLINIC | Age: 30
End: 2025-06-20
Payer: COMMERCIAL

## 2025-06-20 VITALS
TEMPERATURE: 97.2 F | OXYGEN SATURATION: 99 % | WEIGHT: 119 LBS | DIASTOLIC BLOOD PRESSURE: 62 MMHG | BODY MASS INDEX: 21.9 KG/M2 | HEART RATE: 71 BPM | SYSTOLIC BLOOD PRESSURE: 118 MMHG | HEIGHT: 62 IN

## 2025-06-20 DIAGNOSIS — G44.86 CERVICOGENIC HEADACHE: ICD-10-CM

## 2025-06-20 DIAGNOSIS — K64.9 HEMORRHOIDS, UNSPECIFIED HEMORRHOID TYPE: ICD-10-CM

## 2025-06-20 DIAGNOSIS — L21.0 DANDRUFF: ICD-10-CM

## 2025-06-20 DIAGNOSIS — R79.89 ELEVATED LFTS: ICD-10-CM

## 2025-06-20 DIAGNOSIS — R53.83 OTHER FATIGUE: ICD-10-CM

## 2025-06-20 DIAGNOSIS — Z00.00 ANNUAL PHYSICAL EXAM: Primary | ICD-10-CM

## 2025-06-20 PROCEDURE — 99395 PREV VISIT EST AGE 18-39: CPT | Performed by: FAMILY MEDICINE

## 2025-06-20 PROCEDURE — 99214 OFFICE O/P EST MOD 30 MIN: CPT | Performed by: FAMILY MEDICINE

## 2025-06-20 RX ORDER — KETOCONAZOLE 20 MG/ML
1 SHAMPOO, SUSPENSION TOPICAL 2 TIMES WEEKLY
Qty: 120 ML | Refills: 0 | Status: SHIPPED | OUTPATIENT
Start: 2025-06-23

## 2025-06-20 NOTE — PROGRESS NOTES
Name: Brie Hanson      : 1995      MRN: [id number]  Encounter Provider: Shree Romero MD  Encounter Date: 2025   Encounter department: [facility]    :  Assessment & Plan  Elevated LFTs    Orders:    Comprehensive metabolic panel; Future    Other fatigue    Orders:    Vitamin D 25 hydroxy; Future    Iron Panel (Includes Ferritin, Iron Sat%, Iron, and TIBC); Future    Dandruff    Orders:    ketoconazole (NIZORAL) 2 % shampoo; Apply 1 Application topically 2 (two) times a week    Hemorrhoids, unspecified hemorrhoid type         Cervicogenic headache         Annual physical exam           Assessment & Plan  1. Hemorrhoids.  - Hemorrhoids have resolved with the use of a steroid cream.  - Advised to maintain adequate hydration, incorporate sufficient fiber into the diet, and avoid prolonged periods on the toilet.  - Prescription for Anusol provided; follow-up with colorectal specialist if condition recurs and does not improve with cream.    2. Elevated bilirubin levels.  - Bilirubin levels slightly elevated at 1.34, not currently concerning.  - Normal thyroid panel and liver enzymes.  - Repeat lab test to be conducted in 6 months to monitor bilirubin levels.  - Further investigation will be initiated if levels remain abnormal.    3. Fatigue.  - Fatigue likely due to lifestyle and work environment.  - Normal CBC and thyroid panel.  - Lab tests for vitamin D and iron levels ordered to rule out deficiencies.  - Tests can be conducted at patient's convenience.    4. Cervicogenic headaches.  - Headaches likely due to muscle spasms from poor posture or dehydration.  - Forward flexed shoulders observed.  - Advised to perform simple stretches to alleviate symptoms.  - Return for consultation if symptoms worsen; muscle relaxer may be prescribed if necessary.    5. Dandruff   - Refill for shampoo provided.    Follow-up  - Follow-up in 1 year, or sooner if necessary.        "    History of Present Illness     History of Present Illness  The patient presents for an annual exam.    She reports a resolution of her previous condition following the application of a steroid cream. She is aware that the condition can recur and has been advised to use the prescribed cream if symptoms re-emerge. If there is no improvement, she will seek further consultation with a colorectal specialist.    She expresses concern about her recent lab results, noting that all parameters were within normal limits except for one. She is particularly interested in understanding the implications of these results.    She has been experiencing persistent fatigue, which she attributes to her 12-hour work shifts. However, she also reports a lack of motivation to engage in activities during her days off. She is uncertain if this could be due to a vitamin deficiency and is seeking advice on potential supplements.    She requests a refill of her prescribed shampoo, which she finds beneficial.    She occasionally experiences sharp neck pain, predominantly on the left side, which radiates to the back of her head, resulting in headaches. These episodes occur infrequently, approximately once every few months. She describes a sudden onset of neck tension and pain during stretching exercises, which restricts her neck mobility. She has found that increased water intake alleviates these symptoms.     Review of Systems   All other systems reviewed and are negative.    Objective   /62   Pulse 71   Temp (!) 97.2 °F (36.2 °C) (Tympanic)   Ht 5' 2\" (1.575 m)   Wt 54 kg (119 lb)   SpO2 99%   BMI 21.77 kg/m²     Physical Exam  Respiratory: Clear to auscultation, no wheezing, rales or rhonchi  Cardiovascular: Regular rate and rhythm, no murmurs, rubs, or gallops  Musculoskeletal: Forward flexed shoulders noted  Physical Exam  Vitals and nursing note reviewed.   Constitutional:       General: She is not in acute distress.     " Appearance: Normal appearance. She is well-developed. She is not ill-appearing, toxic-appearing or diaphoretic.   HENT:      Head: Normocephalic and atraumatic.      Mouth/Throat:      Mouth: Mucous membranes are moist.      Pharynx: Oropharynx is clear. No oropharyngeal exudate.     Eyes:      General:         Right eye: No discharge.         Left eye: No discharge.      Extraocular Movements: Extraocular movements intact.      Conjunctiva/sclera: Conjunctivae normal.      Pupils: Pupils are equal, round, and reactive to light.       Cardiovascular:      Rate and Rhythm: Normal rate and regular rhythm.      Pulses: Normal pulses.      Heart sounds: Normal heart sounds. No murmur heard.  Pulmonary:      Effort: Pulmonary effort is normal. No respiratory distress.      Breath sounds: Normal breath sounds.   Abdominal:      General: There is no distension.      Palpations: Abdomen is soft.      Tenderness: There is no abdominal tenderness.     Musculoskeletal:         General: No swelling. Normal range of motion.      Cervical back: Normal range of motion and neck supple. No rigidity or tenderness.   Lymphadenopathy:      Cervical: No cervical adenopathy.     Skin:     General: Skin is warm and dry.      Capillary Refill: Capillary refill takes less than 2 seconds.     Neurological:      General: No focal deficit present.      Mental Status: She is alert and oriented to person, place, and time.     Psychiatric:         Mood and Affect: Mood normal.         Behavior: Behavior normal.         Thought Content: Thought content normal.         Judgment: Judgment normal.